# Patient Record
Sex: FEMALE | Race: BLACK OR AFRICAN AMERICAN | NOT HISPANIC OR LATINO | URBAN - METROPOLITAN AREA
[De-identification: names, ages, dates, MRNs, and addresses within clinical notes are randomized per-mention and may not be internally consistent; named-entity substitution may affect disease eponyms.]

---

## 2024-06-14 RX ORDER — CHOLECALCIFEROL (VITAMIN D3) 125 MCG
0 CAPSULE ORAL
Refills: 0 | DISCHARGE

## 2024-06-14 NOTE — ASU PATIENT PROFILE, ADULT - AS SC BRADEN ACTIVITY
- Please consider KCl for hypokalemia  - If patient is transferred to the floor, he will recommend telemetry until hypokalemia resolves - Recommend bolus potassium if possible, at the minimum keeping non-dextrose containing IVF with potassium chloride and acetate for now until PO improves  - Please consider KCl orally for hypokalemia  - Ideally above 3.5 mmol/L before going to floor  - If patient is transferred to the floor, he will recommend telemetry until hypokalemia resolves (4) walks frequently

## 2024-06-14 NOTE — ASU PATIENT PROFILE, ADULT - NS PREOP UNDERSTANDS INFO
Informed pt about surgery time 1645, last meal 0845 no dairy, and last drink 1345 of water or apple juice 3 hrs before surgery. Bring photo ID and isurance card to the first floor. Must have an escort that is 18+. Leave jewelry, piercings, and contacts at home. Please avoid smoking days leading up to surgery/yes

## 2024-06-14 NOTE — ASU PATIENT PROFILE, ADULT - NSICDXPASTSURGICALHX_GEN_ALL_CORE_FT
PAST SURGICAL HISTORY:  Breast cyst     History of bunionectomy      PAST SURGICAL HISTORY:  Breast cyst     History of bunionectomy     History of esophagogastroduodenoscopy (EGD)

## 2024-06-14 NOTE — ASU PATIENT PROFILE, ADULT - FALL HARM RISK - UNIVERSAL INTERVENTIONS
Bed in lowest position, wheels locked, appropriate side rails in place/Call bell, personal items and telephone in reach/Instruct patient to call for assistance before getting out of bed or chair/Non-slip footwear when patient is out of bed/[place] to call system/Physically safe environment - no spills, clutter or unnecessary equipment/Purposeful Proactive Rounding/Room/bathroom lighting operational, light cord in reach

## 2024-06-14 NOTE — ASU PATIENT PROFILE, ADULT - NSICDXPASTMEDICALHX_GEN_ALL_CORE_FT
PAST MEDICAL HISTORY:  GERD (gastroesophageal reflux disease)     Hyperlipidemia     Insomnia     Osteoporosis      PAST MEDICAL HISTORY:  GERD (gastroesophageal reflux disease)     Hiatal hernia     Hyperlipidemia     Insomnia     Osteoporosis

## 2024-06-17 ENCOUNTER — OUTPATIENT (OUTPATIENT)
Dept: OUTPATIENT SERVICES | Facility: HOSPITAL | Age: 74
LOS: 1 days | Discharge: HOME | End: 2024-06-17

## 2024-06-17 VITALS
RESPIRATION RATE: 16 BRPM | SYSTOLIC BLOOD PRESSURE: 133 MMHG | OXYGEN SATURATION: 99 % | DIASTOLIC BLOOD PRESSURE: 68 MMHG | TEMPERATURE: 98 F | HEIGHT: 66 IN | HEART RATE: 67 BPM | WEIGHT: 148.59 LBS

## 2024-06-17 VITALS
DIASTOLIC BLOOD PRESSURE: 67 MMHG | OXYGEN SATURATION: 100 % | TEMPERATURE: 97 F | SYSTOLIC BLOOD PRESSURE: 138 MMHG | HEART RATE: 67 BPM | RESPIRATION RATE: 16 BRPM

## 2024-06-17 DIAGNOSIS — N60.09 SOLITARY CYST OF UNSPECIFIED BREAST: Chronic | ICD-10-CM

## 2024-06-17 DIAGNOSIS — Z98.890 OTHER SPECIFIED POSTPROCEDURAL STATES: Chronic | ICD-10-CM

## 2024-06-17 DEVICE — LENS IOL TECNIS EYHANCE DIB00 25.0D
Type: IMPLANTABLE DEVICE | Site: RIGHT | Status: NON-FUNCTIONAL
Removed: 2024-06-17

## 2024-06-17 RX ORDER — MIRTAZAPINE 45 MG/1
1 TABLET, ORALLY DISINTEGRATING ORAL
Refills: 0 | DISCHARGE

## 2024-06-17 RX ORDER — FAMOTIDINE 10 MG/ML
1 INJECTION INTRAVENOUS
Refills: 0 | DISCHARGE

## 2024-06-17 RX ORDER — ACETAMINOPHEN 500 MG
650 TABLET ORAL ONCE
Refills: 0 | Status: DISCONTINUED | OUTPATIENT
Start: 2024-06-17 | End: 2024-06-17

## 2024-06-17 RX ORDER — ATORVASTATIN CALCIUM 80 MG/1
1 TABLET, FILM COATED ORAL
Refills: 0 | DISCHARGE

## 2024-06-17 RX ORDER — ALENDRONATE SODIUM 70 MG/1
1 TABLET ORAL
Refills: 0 | DISCHARGE

## 2024-06-17 RX ORDER — SODIUM CHLORIDE 9 MG/ML
500 INJECTION, SOLUTION INTRAVENOUS
Refills: 0 | Status: DISCONTINUED | OUTPATIENT
Start: 2024-06-17 | End: 2024-06-17

## 2024-06-17 NOTE — ASU DISCHARGE PLAN (ADULT/PEDIATRIC) - NS MD DC FALL RISK RISK
For information on Fall & Injury Prevention, visit: [email or web address] OR  [email or web address] OR  https://www.cdc.gov/steadi/patient.html

## 2024-06-17 NOTE — OPERATIVE REPORT - OPERATIVE RPOSRT DETAILS
PRE-OP DIAGNOSIS: nuclear Cataract__right_ EYE    POST-OP DIAGNOSIS: SAME    ANESTHESIA: MAC    PROCEDURE:nuclear  Cataract__right__ EYE    SPECIMEN/TISSUE REMOVED: None    ESTIMATED BLOOD LOSS: < 1mL    COMPLICATIONS: None    The patient was brought into the operating room, where an intravenous line was inserted.  The patient was then prepped and draped in the usual sterile fashion for eye surgery of the operated eye.  The lid speculum was inserted into the operated eye.     A paracentesis was performed using a fifteen degree blade.  One percent preservative free lidocaine was injected into the anterior chamber.   Trypan blue was injected into the anterior chamber and irrigated out with balanced salt solution.  The anterior chamber was filled with Viscoat. A 2.75 mm keratome was used to make a clear corneal incision.  The cytotome and Utrata forceps were used to make a continuous curvilinear capsulorrhexis.  The lens was hydrodissected and hydrodelineated with balanced salt solution, until it was freely movable.   The phacoemulsification handpiece was used to groove the lens nucleus into four quadrants, which were then removed.  The remaining cortex was removed using irrigation and aspiration.  The anterior chamber and capsular bag were filled with Healon, and the posterior capsule was noted to be clear and intact.    A J and J lens model DIB00 power ___25.0__ was injected into the capsular bag without complications.  The lens was centered with a Sinsky hook. The remaining Healon was removed with irrigation and aspiration on the viscoelastic setting.  Miochol was injected into the anterior chamber to constrict the pupil and pull the iris from the corneal wounds.  The anterior chamber was maintained with balanced salt solution and the corneal wounds were hydrated, and noted to be tight and secure.  The lid speculum was removed from the eye.  The patient received topical Vigamox and pred forte eye drops.  The patient also received Tobradex eye ointment, and the eye was patched and shielded.  The patient was brought to the recovery room in stable condition, alert and oriented times three.  The patient was reminded to follow up in the office the next day.

## 2024-08-21 NOTE — ASU PREOP CHECKLIST - ALLERGY BAND ON
Pt returned from UGI with secretions leaking from end of NG tube. Unable to draw back from NG or push flush through. Charge nurse at bedside. After a some manipulation with secretions in tube able to get it to flush. Reconnected to CLWS with >300ml of wine colored fluid to canister.   done

## (undated) DEVICE — SOL IRR BAL SALT 500ML

## (undated) DEVICE — KIT CENTURION ANTERIOR

## (undated) DEVICE — PACK ANTERIOR SEGMENT

## (undated) DEVICE — DRAPE MICROSCOPE KNOB COVER SMALL (2 PCS)

## (undated) DEVICE — KNIFE ALCON STANDARD FULL HANDLE 15 DEG (PINK)

## (undated) DEVICE — NUCLEUS HYDRODISSECTOR PEARCE ANGLED 25G X 22MM

## (undated) DEVICE — KNIFE FULL HANDLE ANGLE 2.75MM

## (undated) DEVICE — DRSG PAD EYE OVAL

## (undated) DEVICE — CAPSULE GUARD I/A

## (undated) DEVICE — PACK CENTURION 2.75MM

## (undated) DEVICE — SUT NYLON 10-0 12" CU-5

## (undated) DEVICE — GLV 6 PROTEXIS (WHITE)